# Patient Record
Sex: MALE | Race: WHITE
[De-identification: names, ages, dates, MRNs, and addresses within clinical notes are randomized per-mention and may not be internally consistent; named-entity substitution may affect disease eponyms.]

---

## 2017-10-06 ENCOUNTER — HOSPITAL ENCOUNTER (OUTPATIENT)
Dept: HOSPITAL 46 - OPS | Age: 57
Discharge: HOME | End: 2017-10-06
Attending: SURGERY
Payer: COMMERCIAL

## 2017-10-06 VITALS — HEIGHT: 72 IN | BODY MASS INDEX: 26.41 KG/M2 | WEIGHT: 195 LBS

## 2017-10-06 DIAGNOSIS — K62.1: ICD-10-CM

## 2017-10-06 DIAGNOSIS — K29.80: ICD-10-CM

## 2017-10-06 DIAGNOSIS — K44.9: ICD-10-CM

## 2017-10-06 DIAGNOSIS — Z99.81: ICD-10-CM

## 2017-10-06 DIAGNOSIS — K20.0: ICD-10-CM

## 2017-10-06 DIAGNOSIS — Z12.11: Primary | ICD-10-CM

## 2017-10-06 DIAGNOSIS — K29.50: ICD-10-CM

## 2017-10-06 DIAGNOSIS — G47.30: ICD-10-CM

## 2017-10-06 DIAGNOSIS — K63.5: ICD-10-CM

## 2017-10-06 DIAGNOSIS — F41.0: ICD-10-CM

## 2017-10-06 PROCEDURE — 0DB98ZX EXCISION OF DUODENUM, VIA NATURAL OR ARTIFICIAL OPENING ENDOSCOPIC, DIAGNOSTIC: ICD-10-PCS | Performed by: SURGERY

## 2017-10-06 PROCEDURE — 0DBN8ZX EXCISION OF SIGMOID COLON, VIA NATURAL OR ARTIFICIAL OPENING ENDOSCOPIC, DIAGNOSTIC: ICD-10-PCS | Performed by: SURGERY

## 2017-10-06 PROCEDURE — 0DB68ZX EXCISION OF STOMACH, VIA NATURAL OR ARTIFICIAL OPENING ENDOSCOPIC, DIAGNOSTIC: ICD-10-PCS | Performed by: SURGERY

## 2017-10-06 PROCEDURE — 0DB28ZX EXCISION OF MIDDLE ESOPHAGUS, VIA NATURAL OR ARTIFICIAL OPENING ENDOSCOPIC, DIAGNOSTIC: ICD-10-PCS | Performed by: SURGERY

## 2017-10-06 PROCEDURE — 0DB38ZX EXCISION OF LOWER ESOPHAGUS, VIA NATURAL OR ARTIFICIAL OPENING ENDOSCOPIC, DIAGNOSTIC: ICD-10-PCS | Performed by: SURGERY

## 2017-10-06 PROCEDURE — 0DBP8ZX EXCISION OF RECTUM, VIA NATURAL OR ARTIFICIAL OPENING ENDOSCOPIC, DIAGNOSTIC: ICD-10-PCS | Performed by: SURGERY

## 2017-10-06 PROCEDURE — 0DB78ZX EXCISION OF STOMACH, PYLORUS, VIA NATURAL OR ARTIFICIAL OPENING ENDOSCOPIC, DIAGNOSTIC: ICD-10-PCS | Performed by: SURGERY

## 2017-10-06 NOTE — NUR
10/06/17 Ivana9 Emilia Del Castillo
1056-PATIENT ARRIVED TO PACU ON 10L MASK O2 SAT 99% PATIENT
NONAROUSABLE. ORAL AIRWAY IN PLACE. ABDOMEN SOFT LAYING LEFT LATERAL.

## 2017-11-03 NOTE — OR
[facility]
                                    [address], [place]  [address]
_________________________________________________________________________________________
                                                                 Signed   
 
 
DATE OF PROCEDURE:  10/06/17 
 
PREOPERATIVE DIAGNOSES 
Longstanding gastroesophageal reflux with treatment
Colon screening.
 
POSTOPERATIVE DIAGNOSES 
Hiatal hernia with poor flap valve. No sign of active inflammation.
Gastric polyps.
Multiple hyperplastic polyps of rectosigmoid and rectum
 
PROCEDURE PERFORMED 
Esophagogastroduodenoscopy with cold morcellation, excision of gastric polyp as well as
biopsies.
Total colonoscopy to cecum with multiple hyperplastic polyp excision (cold technique)
 
SURGEON:  Thais Doan MD.
 
ANESTHESIA:  Propofol infusion (Thais May CRNA).
 
INDICATION 
This 57-year-old white man is patient of Dr. Randall and well known to me from the
past. He was seen a year ago in July of 2016 and follow-up from endoscopically
demonstrated gastroesophageal reflux with esophagitis. He takes Prilosec on a daily
basis to control his symptoms which is working well. A video esophagram has shown
"massive reflux." His symptoms do seem to be well controlled currently. He has no
"heartburn" as he had in the past.
 
His last colonoscopy was 5 years ago at which time, he was bothered by pruritus ani. He
had no colonic lesions of note at that time. He is admitted at this time to undergo
upper endoscopy and colonoscopy. He understands the risks of bleeding, infection, and
perforation.
 
FINDINGS 
Upper endoscopy did demonstrate a poor flap valve, small hiatal hernia but no sign of
active inflammation nor Joseph's esophagus. There were gastric polyps probably related
to PPI use. Stomach and duodenum were otherwise normal.
 
On colonoscopy, the prep was surprisingly good considering he truncated his low-fiber
diet, anticipating the procedure today. Complete colonoscopy was undertaken to the
cecum. There were numerous hyperplastic appearing polyps of the rectosigmoid and rectum.
They were excised. There were no other findings of concern.
 
    Electronically Signed By: THAIS DOAN MD  11/03/17 0748
_________________________________________________________________________________________
PATIENT NAME:     FARHANA CUNHA                   
MEDICAL RECORD #: [id number]                     OPERATIVE REPORT              
          ACCT #: [id number]  
DATE OF BIRTH:   [birth year]                                       
PHYSICIAN:   THAIS DOAN MD                      REPORT #: [id number]
REPORT IS CONFIDENTIAL AND NOT TO BE RELEASED WITHOUT AUTHORIZATION
 
 
                                  [facility]
                                    [address], [place]  [address]
_________________________________________________________________________________________
                                                                 Signed   
 
 
 
DESCRIPTION OF PROCEDURE 
The patient was brought to the endoscopy suite and placed in lateral decubitus position
after undergoing topical Hurricaine spray hypopharyngeal anesthesia. He was given
intravenous Propofol infusional sedation based on his severe anxiety disorder. A bite
block was placed. An Olympus video upper endoscope was passed in the hypopharynx. The
vocal cords appeared normal. Stomach appeared essentially normal without sign of
Joseph's epithelium, ulceration or stricture or varices. The scope was advanced to the
stomach which was insufflated with air. Rugal folds appeared normal. There was mild
inflammatory change of the antrum but no ulceration or erosion. Several gastric polyps
were noted most likely hyperplastic related to PPI use. The pylorus was normal. Scope
was passed through into the duodenum. The duodenum appeared normal. Biopsies were
obtained to assess for celiac disease. The scope was withdrawn to the stomach and
excision of a representative polyp was undertaken. Additional biopsies were taken of the
antrum for both NICOLE and pathologic testing. CLOtest was negative 30 minutes post
procedure. Retroflexed view confirmed a poor flap valve and small hiatal hernia. Scope
was withdrawn to the distal esophagus and biopsies taken there as well as the mid
esophagus. The scope was removed and plans made for colonoscopy. Digital rectal
examination was performed, which was normal. An Olympus video colonoscope was passed in
the rectum and manipulated throughout the colon ultimately intubating the cecum itself.
The ileocecal valve and appendiceal orifice were normal. The scope was withdrawn from
that point. Examination throughout showed no sign of abnormality into the rectosigmoid
where small hyperplastic appearing polyps were noted. These were excised with cold
morcellation technique. Retroflexed view confirmed additional small polyps to the low
rectum. They were excised as well. Numerous such lesions were excised. The scope was
removed and the patient taken to recovery room in good condition.
 
CONCLUDING DIAGNOSES 
Gastroesophageal reflux well managed with medications at this time. Gastric polyps
probably reactive to PPI use.
Hiatal hernia.
Hyperplastic polyps rectosigmoid and rectum.
 
PLAN 
Recommend repeat colonoscopy in 3 years. Upper endoscopy as appropriate.
 
 
 
 
_____________________________
Thais Doan MD
 
 
    Electronically Signed By: THAIS DOAN MD  11/03/17 0748
_________________________________________________________________________________________
PATIENT NAME:     FARHANA CUNHA                   
MEDICAL RECORD #: [id number]                     OPERATIVE REPORT              
          ACCT #: [id number]  
DATE OF BIRTH:   [birth year]                                       
PHYSICIAN:   THAIS DOAN MD                      REPORT #: [id number]
REPORT IS CONFIDENTIAL AND NOT TO BE RELEASED WITHOUT AUTHORIZATION
 
 
                                  [facility]
                                    [address], [place]  [address]
_________________________________________________________________________________________
                                                                 Signed   
 
 
SURINDER/Ze
DD: 10/06/2017 11:10:51
DT: 10/06/2017 12:10:31
Job #: [id number]
 
cc:  Jair Randall MD
 
 
 
 
 
 
 
 
 
 
 
 
 
 
 
 
 
 
 
 
 
 
 
 
 
 
 
 
 
 
 
 
 
 
 
 
 
    Electronically Signed By: THAIS DOAN MD  11/03/17 0748
_________________________________________________________________________________________
PATIENT NAME:     FARHANA CUNHA ALEXANDER                   
MEDICAL RECORD #: [id number]                     OPERATIVE REPORT              
          ACCT #: [id number]  
DATE OF BIRTH:   [birth year]                                       
PHYSICIAN:   THAIS DOAN MD                      REPORT #: [id number]
REPORT IS CONFIDENTIAL AND NOT TO BE RELEASED WITHOUT AUTHORIZATION

## 2019-03-06 ENCOUNTER — HOSPITAL ENCOUNTER (OUTPATIENT)
Dept: HOSPITAL 46 - DS | Age: 59
Discharge: HOME | End: 2019-03-06
Attending: INTERNAL MEDICINE
Payer: COMMERCIAL

## 2019-03-06 VITALS — WEIGHT: 200 LBS | HEIGHT: 71 IN | BODY MASS INDEX: 28 KG/M2

## 2019-03-06 DIAGNOSIS — D61.818: Primary | ICD-10-CM

## 2019-03-06 DIAGNOSIS — K21.9: ICD-10-CM

## 2019-03-06 DIAGNOSIS — Z79.899: ICD-10-CM

## 2019-03-06 DIAGNOSIS — D72.819: ICD-10-CM

## 2019-03-06 DIAGNOSIS — D75.89: ICD-10-CM

## 2019-03-06 PROCEDURE — 07DR3ZX EXTRACTION OF ILIAC BONE MARROW, PERCUTANEOUS APPROACH, DIAGNOSTIC: ICD-10-PCS | Performed by: INTERNAL MEDICINE

## 2019-03-06 PROCEDURE — 079T3ZX DRAINAGE OF BONE MARROW, PERCUTANEOUS APPROACH, DIAGNOSTIC: ICD-10-PCS | Performed by: INTERNAL MEDICINE

## 2019-03-06 NOTE — NUR
03/06/19 1240 Emilia Del Castillo
1231-PATIENT ARRIVED TO PACU ON 2L NC AWAKE DROWSY DENIES PAIN OR
NAUSEA. BANDAID CDI TO BACK. RR EVEN.

## 2021-01-15 NOTE — NUR
01/15/21 0840 Shantal Pedraza
0835 PATIENT ARRIVES TO PACU AWAKE BUT DROWSY. RESP EVEN AND
UNLABORED, NC AT 4 LITERS. PATIENT DENIES PAIN OR NAUSEA.

## 2021-01-19 NOTE — PATH
[facility]
                                    [address] [facility] [address]
                                  [place], [place]  [address]
_________________________________________________________________________________________
                                                                 Signed   
 
 
 
SPECIMEN(S): A DUODENAL BIOPSY
SPECIMEN(S): B POLYP BIOPSY
SPECIMEN(S): C ANTRUM/PYLORUS BIOPSY
SPECIMEN(S): D DISTAL ESOPHAGEAL BIOPSY
SPECIMEN(S): E MID ESOPHAGEAL BIOPSY
 
SPECIMEN SOURCE:
A. DUODENAL BIOPSY
B. POLYP BIOPSY
C. ANTRUM/PYLORUS BIOPSY
D. DISTAL ESOPHAGEAL BIOPSY
E. MID ESOPHAGEAL BIOPSY
 
CLINICAL HISTORY:
GERD, chronic esophagitis.  Post-op:  Hiatal hernia, mild esophagitis, gastric 
polyps. 
MICROSCOPIC DESCRIPTION:
Histologic sections of all submitted blocks are examined by light microscopy. 
These findings, together with the gross examination, support the pathologic 
diagnosis. 
 
FINAL PATHOLOGIC DIAGNOSIS:
A.  Duodenal biopsy:
-  Benign duodenal mucosa, negative for specific diagnostic abnormality.
B.  Polyp, biopsy:
-  Benign fundic gland polyp (one fragment).
C.  Antrum/pylorus, biopsy:
-  Benign gastric type mucosa with focal slight chronic inflammation.
-  Negative for evidence of Helicobacter organisms on routine HE stained 
sections. 
D.  Distal esophageal biopsy:
-  Squamocolumnar junction with reactive features, negative for specialized 
intestinal metaplasia or dysplasia. 
E.  Mid esophageal biopsy:
-  Benign esophageal mucosa, negative for significantly increased epithelial 
eosinophils. 
-  Fragment of benign gastric type mucosa with focal sight chronic 
inflammation. (see comment) 
 
COMMENT:
Regarding specimen E:  The significance of the benign gastric mucosal fragment 
 
                                                                                    
_________________________________________________________________________________________
PATIENT NAME:     FARHANARIMA MUNOZ                   
MEDICAL RECORD #: [id number]            PATHOLOGY                     
          ACCT #: [id number]       ACCESSION #: [id number]     
DATE OF BIRTH:   [birth year]            REPORT #: [id number]       
PHYSICIAN:        RICARDO PATHOLOGY              
PCP:              VIVIANE TAYLOR MD           
REPORT IS CONFIDENTIAL AND NOT TO BE RELEASED WITHOUT AUTHORIZATION
 
 
                                  [facility]
                                    [address], [place]  [address]
_________________________________________________________________________________________
                                                                 Signed   
 
 
in the mid esophageal biopsy is uncertain as it is possible that this is a 
contaminant from the antral biopsy or distal 
esophagus.  Clinical correlation is requested.
JVR:cml:C2NR
 
GROSS DESCRIPTION:
Five specimens are received in five containers, labeled "DB."
A.  The specimen, labeled "DB, 1," and designated on the requisition "duodenum 
biopsy," is received in formalin and consists of two tan soft tissue fragments 
that measure 0.3 and 0.5 cm in greatest 
dimension. The specimen is entirely submitted in cassette (A1).
B.  The specimen, labeled "DB, 2," and designated on the requisition "polyp 
biopsy," is received in formalin and consists of one tan soft tissue fragment 
that measures 0.4 cm in greatest dimension. 
The specimen is entirely submitted in cassette (B1).
C.  The specimen, labeled "DB, 3," and designated on the requisition "antrum 
biopsy," is received in formalin and consists of two tan soft tissue fragments 
that measure 0.5 and 0.6 cm in greatest 
dimension. The specimen is entirely submitted in cassette (C1).
 
D.  The specimen, labeled "DB, 4," and designated on the requisition "esophagus 
biopsy, distal," is received in formalin and consists of six tan-white soft 
tissue fragments that measure 0.1 up to 0.7 
cm in greatest dimension. The specimen is entirely submitted in cassette (D1).
E.  The specimen, labeled "DB, 5," and designated on the requisition "proximal 
esophagus biopsy," is received in formalin and consists of three tan-white soft 
tissue fragments that measure 0.2  to 0.6 
cm in greatest dimension. The specimen is entirely submitted in cassette (E1).
AI (under the direct supervision of a pathologist)
The Gross Description was prepared using a voice recognition system. The report 
was reviewed for accuracy; however, sound-alike word errors, addition and/or 
deletions may occur. If there is any 
question about this report, please contact Client Services.
 
PERFORMING LABORATORY:
The technical component was performed by [employer], [address] [place] [id number] (Medical Director: Sugey Robles MD; CLIA# [id number]). 
Professional interpretation was performed by 
[employer] [address]. 
 
Diagnostician:  Arthur Tony MD
 
                                                                                    
_________________________________________________________________________________________
PATIENT NAME:     FARHANA CUNHA                   
MEDICAL RECORD #: [id number]            PATHOLOGY                     
          ACCT #: [id number]       ACCESSION #: [id number]     
DATE OF BIRTH:   [birth year]            REPORT #: [id number]       
PHYSICIAN:        RICARDO NESS              
PCP:              VIVIANE TAYLOR MD           
REPORT IS CONFIDENTIAL AND NOT TO BE RELEASED WITHOUT AUTHORIZATION
 
 
                                  [facility]
                                    [address] [place] [address]  [address]
_________________________________________________________________________________________
                                                                 Signed   
 
 
Pathologist
Electronically Signed 01/19/2021
 
 
Copies:                                
~
 
 
 
 
 
 
 
 
 
 
 
 
 
 
 
 
 
 
 
 
 
 
 
 
 
 
 
 
 
 
 
 
 
 
 
 
 
                                                                                    
_________________________________________________________________________________________
PATIENT NAME:     FARHANA CUNHA                   
MEDICAL RECORD #: [id number]            PATHOLOGY                     
          ACCT #: [id number]       ACCESSION #: [id number]     
DATE OF BIRTH:   [birth year]            REPORT #: [id number]       
PHYSICIAN:        RICARDO PATHOLOGY              
PCP:              VIVIANE TAYLOR MD           
REPORT IS CONFIDENTIAL AND NOT TO BE RELEASED WITHOUT AUTHORIZATION

## 2023-08-10 ENCOUNTER — HOSPITAL ENCOUNTER (OUTPATIENT)
Dept: HOSPITAL 46 - DS | Age: 63
Discharge: HOME | End: 2023-08-10
Payer: COMMERCIAL

## 2023-08-10 DIAGNOSIS — Z79.899: ICD-10-CM

## 2023-08-10 DIAGNOSIS — Z86.010: ICD-10-CM

## 2023-08-10 DIAGNOSIS — K21.9: ICD-10-CM

## 2023-08-10 DIAGNOSIS — Z12.11: Primary | ICD-10-CM

## 2023-08-10 DIAGNOSIS — K57.30: ICD-10-CM

## 2023-08-10 DIAGNOSIS — F41.1: ICD-10-CM

## 2023-08-10 DIAGNOSIS — L57.0: ICD-10-CM

## 2023-08-10 PROCEDURE — 0DJD8ZZ INSPECTION OF LOWER INTESTINAL TRACT, VIA NATURAL OR ARTIFICIAL OPENING ENDOSCOPIC: ICD-10-PCS | Performed by: SURGERY
